# Patient Record
Sex: FEMALE | Race: WHITE | NOT HISPANIC OR LATINO | URBAN - METROPOLITAN AREA
[De-identification: names, ages, dates, MRNs, and addresses within clinical notes are randomized per-mention and may not be internally consistent; named-entity substitution may affect disease eponyms.]

---

## 2021-09-09 ENCOUNTER — HOSPITAL ENCOUNTER (OUTPATIENT)
Facility: CLINIC | Age: 26
Discharge: HOME | End: 2021-09-09
Attending: FAMILY MEDICINE

## 2021-09-09 VITALS
OXYGEN SATURATION: 99 % | TEMPERATURE: 97.9 F | HEART RATE: 67 BPM | SYSTOLIC BLOOD PRESSURE: 122 MMHG | DIASTOLIC BLOOD PRESSURE: 58 MMHG | RESPIRATION RATE: 16 BRPM

## 2021-09-09 DIAGNOSIS — Z02.5 SPORTS PHYSICAL: Primary | ICD-10-CM

## 2021-09-09 PROCEDURE — 99499SP PR $65 PHYSICAL: Performed by: FAMILY MEDICINE

## 2021-09-09 ASSESSMENT — ENCOUNTER SYMPTOMS
SHORTNESS OF BREATH: 0
HEADACHES: 0
SEIZURES: 0
DIZZINESS: 0
BACK PAIN: 0
CHILLS: 0
FEVER: 0

## 2021-09-09 NOTE — ED PROVIDER NOTES
History  No chief complaint on file.    26yoF with no pertinent PMH presents for college sports physical exam. Denies FH of sudden cardiac death. Denies personal h/o CP, palpitations, SOB, or syncope during exercise. Pt has never been told she cannot play sports due to a medical reason. Pt has no concerns today. Denies h/o Covid and is fully vaccinated.          History reviewed. No pertinent past medical history.    No past surgical history on file.    History reviewed. No pertinent family history.    Social History     Tobacco Use   • Smoking status: Never Smoker   • Smokeless tobacco: Never Used   Substance Use Topics   • Alcohol use: Not on file   • Drug use: Not on file       Review of Systems   Constitutional: Negative for chills and fever.   HENT: Negative for hearing loss.    Eyes: Negative for visual disturbance.   Respiratory: Negative for shortness of breath.    Cardiovascular: Negative for chest pain.   Musculoskeletal: Negative for back pain.   Skin: Negative for rash.   Neurological: Negative for dizziness, seizures and headaches.   All other systems reviewed and are negative.      Physical Exam  ED Triage Vitals [09/09/21 1417]   Temp Heart Rate Resp BP SpO2   36.6 °C (97.9 °F) 67 16 (!) 122/58 99 %      Temp src Heart Rate Source Patient Position BP Location FiO2 (%) (Set)   -- -- -- -- --       Physical Exam  Vitals and nursing note reviewed.   Constitutional:       General: She is not in acute distress.     Appearance: Normal appearance. She is not ill-appearing.   HENT:      Head: Normocephalic and atraumatic.      Right Ear: Tympanic membrane, ear canal and external ear normal.      Left Ear: Tympanic membrane, ear canal and external ear normal.      Nose: Nose normal.      Mouth/Throat:      Mouth: Mucous membranes are moist.      Pharynx: Oropharynx is clear.   Eyes:      Extraocular Movements: Extraocular movements intact.      Conjunctiva/sclera: Conjunctivae normal.      Pupils: Pupils are  equal, round, and reactive to light.   Cardiovascular:      Rate and Rhythm: Normal rate and regular rhythm.      Pulses: Normal pulses.      Heart sounds: Normal heart sounds. No murmur heard.        Comments: No murmurs appreciated with provocative maneuvers  Pulmonary:      Effort: Pulmonary effort is normal. No respiratory distress.      Breath sounds: Normal breath sounds.   Abdominal:      General: There is no distension.      Palpations: Abdomen is soft.      Tenderness: There is no abdominal tenderness. There is no guarding.   Musculoskeletal:         General: Normal range of motion.      Cervical back: Normal range of motion and neck supple.   Skin:     General: Skin is warm and dry.   Neurological:      General: No focal deficit present.      Mental Status: She is alert.   Psychiatric:         Mood and Affect: Mood normal.           Procedures  Procedures    UC Course  Clinical Impressions as of Sep 09 1528   Sports physical       MDM  Number of Diagnoses or Management Options  Sports physical  Diagnosis management comments: Form completed and returned to patient. F/u with PCP as needed                 Zamzam Eckert,   09/09/21 1531       Zamzam Eckert,   09/09/21 1541

## 2022-02-14 ENCOUNTER — HOSPITAL ENCOUNTER (EMERGENCY)
Facility: HOSPITAL | Age: 27
Discharge: HOME | End: 2022-02-14
Attending: EMERGENCY MEDICINE | Admitting: EMERGENCY MEDICINE
Payer: COMMERCIAL

## 2022-02-14 VITALS
HEART RATE: 69 BPM | WEIGHT: 136 LBS | OXYGEN SATURATION: 100 % | TEMPERATURE: 97.5 F | SYSTOLIC BLOOD PRESSURE: 128 MMHG | DIASTOLIC BLOOD PRESSURE: 69 MMHG | RESPIRATION RATE: 16 BRPM

## 2022-02-14 DIAGNOSIS — M62.838 TRAPEZIUS MUSCLE SPASM: Primary | ICD-10-CM

## 2022-02-14 PROCEDURE — 99281 EMR DPT VST MAYX REQ PHY/QHP: CPT

## 2022-02-14 PROCEDURE — 63700000 HC SELF-ADMINISTRABLE DRUG: Performed by: PHYSICIAN ASSISTANT

## 2022-02-14 RX ORDER — LIDOCAINE 560 MG/1
1 PATCH PERCUTANEOUS; TOPICAL; TRANSDERMAL ONCE
Status: DISCONTINUED | OUTPATIENT
Start: 2022-02-14 | End: 2022-02-15 | Stop reason: HOSPADM

## 2022-02-14 RX ORDER — DIAZEPAM 5 MG/1
5 TABLET ORAL EVERY 6 HOURS PRN
Qty: 10 TABLET | Refills: 0 | Status: SHIPPED | OUTPATIENT
Start: 2022-02-14 | End: 2022-03-16

## 2022-02-14 RX ORDER — IBUPROFEN 600 MG/1
600 TABLET ORAL ONCE
Status: DISCONTINUED | OUTPATIENT
Start: 2022-02-14 | End: 2022-02-15 | Stop reason: HOSPADM

## 2022-02-14 RX ORDER — ISOTRETINOIN 40 MG/1
40 CAPSULE ORAL 2 TIMES DAILY
COMMUNITY

## 2022-02-14 RX ORDER — DIAZEPAM 5 MG/1
5 TABLET ORAL ONCE
Status: DISCONTINUED | OUTPATIENT
Start: 2022-02-14 | End: 2022-02-14

## 2022-02-14 RX ADMIN — LIDOCAINE 1 PATCH: 246 PATCH TOPICAL at 21:46

## 2022-02-14 ASSESSMENT — ENCOUNTER SYMPTOMS
NUMBNESS: 1
CHILLS: 0
SHORTNESS OF BREATH: 0
HEADACHES: 0
COLOR CHANGE: 0
WEAKNESS: 0
NAUSEA: 0
COUGH: 0
VOMITING: 0
BACK PAIN: 1
DIZZINESS: 0
JOINT SWELLING: 0
ABDOMINAL PAIN: 0
NECK PAIN: 1
FEVER: 0

## 2022-02-15 NOTE — ED PROVIDER NOTES
Emergency Medicine Note  HPI   HISTORY OF PRESENT ILLNESS     25 y/o F with history of chronic neck and back pain secondary to competitive sports in the past presents to the ED for evaluation of right sided neck and scapular pain that began this morning.  Patient woke up without issue and then developed right sided neck pain that migrated to a stabbing pain in right scapula while sitting in class with a tingling sensation in right hand.  No injury, trauma, new exercise, or changes to sleeping position.  Patient took Tylenol and applied ice without relief.  Denies recent illness.  Patient experiences chronic neck/back pain that is typically less severe and in a different location.  She runs for 30 minutes a few times per week - no new strenuous activity. Denies any work outs in the past few days.  Recently started Accutane 3 weeks ago.              Patient History   PAST HISTORY     Reviewed from Nursing Triage:       History reviewed. No pertinent past medical history.    History reviewed. No pertinent surgical history.    History reviewed. No pertinent family history.    Social History     Tobacco Use   • Smoking status: Never Smoker   • Smokeless tobacco: Never Used   Substance Use Topics   • Alcohol use: Not on file   • Drug use: Not on file         Review of Systems   REVIEW OF SYSTEMS     Review of Systems   Constitutional: Negative for chills and fever.   Respiratory: Negative for cough and shortness of breath.    Cardiovascular: Negative for chest pain and leg swelling.   Gastrointestinal: Negative for abdominal pain, nausea and vomiting.   Musculoskeletal: Positive for back pain (R scapula) and neck pain (R neck). Negative for joint swelling.   Skin: Negative for color change and pallor.   Neurological: Positive for numbness (R hand). Negative for dizziness, weakness and headaches.         VITALS     ED Vitals    Date/Time Temp Pulse Resp BP SpO2 [facility]   02/14/22 2021 36.4 °C (97.5 °F) 69 16 128/69 100 % MMM         Pulse Ox %: 100 % (02/14/22 2246)  Pulse Ox Interpretation: Normal (02/14/22 2246)           Physical Exam   PHYSICAL EXAM     Physical Exam  Vitals and nursing note reviewed.   Constitutional:       General: She is not in acute distress.     Appearance: Normal appearance. She is normal weight.   HENT:      Head: Normocephalic and atraumatic.   Eyes:      Conjunctiva/sclera: Conjunctivae normal.   Cardiovascular:      Rate and Rhythm: Normal rate and regular rhythm.      Pulses: Normal pulses.           Radial pulses are 2+ on the right side and 2+ on the left side.   Pulmonary:      Effort: Pulmonary effort is normal.      Breath sounds: Normal breath sounds. No decreased breath sounds or rhonchi.   Musculoskeletal:         General: Normal range of motion.      Right shoulder: Normal. Normal range of motion. Normal strength. Normal pulse.      Left shoulder: Normal.      Right upper arm: Normal.      Left upper arm: Normal.      Right wrist: Normal pulse.      Left wrist: Normal pulse.      Right hand: Normal strength. Normal capillary refill. Normal pulse.      Left hand: Normal strength. Normal capillary refill. Normal pulse.      Cervical back: Normal range of motion and neck supple. Spasms (right trapezius) and tenderness (right trapezius) present. No swelling, deformity, erythema, rigidity or bony tenderness. Normal range of motion.      Thoracic back: Tenderness (subscapular) present. No deformity or bony tenderness. Normal range of motion.      Lumbar back: Normal. No swelling, tenderness or bony tenderness. Normal range of motion.   Skin:     General: Skin is warm and dry.      Capillary Refill: Capillary refill takes less than 2 seconds.   Neurological:      General: No focal deficit present.      Mental Status: She is alert and oriented to person, place, and time.      Sensory: Sensation is intact. No sensory deficit.      Motor: Motor function is intact. No weakness.   Psychiatric:         Mood  and Affect: Mood normal.         Behavior: Behavior normal. Behavior is cooperative.           PROCEDURES     Procedures     DATA     Results     None          Imaging Results    None         No orders to display       Scoring tools                                 ED Course & MDM   MDM / ED COURSE / CLINICAL IMPRESSIONS / DISPO     MDM     The patient was hesitant about taking medications due to the interaction with Accutane.  We looked the medications up and explained that there is no interactions between Accutane, Tylenol, ibuprofen, Flexeril, Valium.  She decided that she would take the ibuprofen here and take a prescription for Valium to take at home.  We recommended using ice and heat on the affected area.  She is neurovascular intact.  Return precautions were discussed    Clinical Impressions as of 02/15/22 3521   Trapezius muscle spasm     Discharge       By signing my name below, IRosita, attest that this documentation has been prepared under the direction and in the presence of Cassia Jha PA-C    2/15/2022 5:28 AM      Cassia SHAH, personally performed the services described in this documentation, as documented by the scribe in my presence, and it is both accurate and complete.  2/15/2022 5:28 AM       Rosita Laird  02/14/22 8169       Cassia Jha PA C  02/15/22 3571

## 2022-02-15 NOTE — DISCHARGE INSTRUCTIONS
TAKE MOTRIN AND TYLENOL EVERY 6 HOURS FOR PAIN  APPLY A LIDOCAINE PATCH ON THE AFFECTED AREA  TAKE VALIUM AS NEEDED FOR SPASM -THIS MEDICATION WILL MAKE YOU TIRED, DO NOT DRIVE OR DRINK ALCOHOL WHILE TAKING      ALTERNATE ICE AND HEAT ON THE AFFECTED AREA, 20 MINUTES ON 20 MINUTES OFF      RETURN TO THE ER IF ANY INCREASE IN FEVER>101, INCREASE NECK PAIN, BACK PAIN, NUMBNESS OR TINGLING IN YOUR EXTREMITIES, WEAKNESS IN YOUR EXTREMITIES, HEADACHE, DIZZINESS, VISION CHANGES, VOMITING, CHEST PAIN, SHORTNESS OF BREATH, CHANGE IN URINATION OR ANY CONCERNS

## 2022-08-24 ENCOUNTER — NEW PATIENT COMPREHENSIVE (OUTPATIENT)
Dept: URBAN - METROPOLITAN AREA CLINIC 76 | Facility: CLINIC | Age: 27
End: 2022-08-24

## 2022-08-24 DIAGNOSIS — H04.123: ICD-10-CM

## 2022-08-24 DIAGNOSIS — H02.886: ICD-10-CM

## 2022-08-24 DIAGNOSIS — H52.13: ICD-10-CM

## 2022-08-24 DIAGNOSIS — H02.883: ICD-10-CM

## 2022-08-24 PROCEDURE — 92004 COMPRE OPH EXAM NEW PT 1/>: CPT

## 2022-08-24 PROCEDURE — 92015 DETERMINE REFRACTIVE STATE: CPT

## 2022-08-24 ASSESSMENT — KERATOMETRY
OS_K2POWER_DIOPTERS: 42.75
OS_AXISANGLE_DEGREES: 24
OS_AXISANGLE2_DEGREES: 114
OS_K1POWER_DIOPTERS: 42.25
OD_AXISANGLE2_DEGREES: 74
OD_AXISANGLE_DEGREES: 164
OD_K2POWER_DIOPTERS: 42.25
OD_K1POWER_DIOPTERS: 41.75

## 2022-08-24 ASSESSMENT — VISUAL ACUITY
OD_CC: 20/20
OS_CC: 20/20-1
OU_CC: 20/20

## 2022-08-24 ASSESSMENT — TONOMETRY
OS_IOP_MMHG: 16
OD_IOP_MMHG: 16

## 2022-11-04 ENCOUNTER — HOSPITAL ENCOUNTER (OUTPATIENT)
Dept: RADIOLOGY | Facility: HOSPITAL | Age: 27
Discharge: HOME | End: 2022-11-04

## 2022-11-04 DIAGNOSIS — S93.402A SPRAIN OF LEFT ANKLE, UNSPECIFIED LIGAMENT, INITIAL ENCOUNTER: ICD-10-CM

## 2022-11-18 ENCOUNTER — EVALUATION (OUTPATIENT)
Dept: PHYSICAL THERAPY | Facility: CLINIC | Age: 27
End: 2022-11-18

## 2022-11-18 DIAGNOSIS — M25.572 ACUTE LEFT ANKLE PAIN: ICD-10-CM

## 2022-11-18 DIAGNOSIS — S93.492A SPRAIN OF ANTERIOR TALOFIBULAR LIGAMENT OF LEFT ANKLE, INITIAL ENCOUNTER: Primary | ICD-10-CM

## 2022-11-18 NOTE — PROGRESS NOTES
PT Evaluation   Today's date: 2022  Patient name: John Courtney  : 1995  MRN: [id number]  Referring provider: No ref  provider found  Dx:   Encounter Diagnosis     ICD-10-CM    1  Sprain of anterior talofibular ligament of left ankle, initial encounter  S93 492A       2  Acute left ankle pain  M25 572           Assessment    John Courtney is a pleasant 33 y o  female who presents with a referring diagnosis of sprain of Lt ATFL  The primary movement system diagnosis is hypomobility with decreased proprioception with nociceptive pain resulting in pathoanatomical symptoms of impaired range of motion, impaired neuromuscular control, and weightbearing intolerance  The aforementioned impairments have limited the patient's ability to participate in recreational activities and running  No further referral is neccessary at this time based upon examination results  Positive prognostic indicators include motivation to improve, positive attitude and age  Negative prognostic indicators include none  Impairments: abnormal or restricted ROM, activity intolerance, Impaired balance, Impaired physical strength, lacks appropriate HEP, pain with function, weight-bearing intolerance, poor posture and poor body mechanics    Symptom Irritability: low    Problem List:  - impaired range of motion   - impaired neuromuscular control     Patient education performed this session included: home exercise program, plan of care, expected tissue healing time, prognosis and diagnosis and ice    Patient verbalized excellent understanding of POC  Please contact me if you have any questions or recommendations  Thank you for the referral and the opportunity to share in [address] care         Plan    Patient would benefit from: Skilled PT  Planned modality interventions: biofeedback, cryotherapy and thermotherapy (hot pack)  Planned therapy interventions: activity modification, balance/weight bearing training, behavior modification, body mechanics training, functional ROM exercises, graded exercise, HEP, joint mobilization, manual therapy, Carroll taping, motor coordination training, neuromuscular re-education, patient education, postural training, strengthening, stretching, therapeutic activities and therapeutic exercises    Frequency: 1x per week  Duration in weeks: 6 weeks    Plan of Care beginning date: 11/18/2022  Plan of Care expiration date: 6 weeks - 12/30/2022  Treatment plan discussed with: patient      Goals    Short Term Goals (3 weeks):    1  Patient will be independent with basic HEP  2  Patient will report >50% reduction in pain  3  Patient will demonstrate >1/3 improvement in MMT grade as applicable  4  Patient will improve Lt ankle range of motion to equal to Rt     Long Term Goals (6 weeks):  1  Patient will be independent in a comprehensive home exercise program  2  Patient FOTO score will improve to 76/100  3  Patient will self-report >75% improvement in function  4  Patient will be able to run for 30 minutes with pain <3/10  5  Patient will be able to ski/snowboard with pain <3/10      History    History of Present Illness  Mechanism of injury: Patient reports a fall down stairs approx 1 month ago  Although she was unable to weightbear, she assumed she had obtained an sprain  When it continued to hurt and became swollen, she saw ortho last week  Xrays showed a nondisplaced fracture of the anterior process of calcaneus  Ortho gave her a brace to wear and she states that she has been wearing it on her long days  Overall, she was on crutches for 3 weeks and NWBing for 2 weeks  Patient tried running again prior to knowing it was fractured so she has stopped since  She does have occasional numbness in her Lt toes since injury      Prior level of function: patient is in PT school; she runs approx 1 hour per day    Weight bearing status: FWB  AD: none    Pain  Current: 2-3/10  At best: 2-3/10  At worst: 5/10    Location: lateral aspect of midfoot, lateral top of foot  Description: dull aching, shooting  Aggravating factors: sleeping (side sleeper), walking the dog (on grass)  Alleviating factors: ice, medications and rest    Progression: improved, plateaued since ortho visit    Patient goals for therapy: ambulate on uneven terrain, running      Physical Examination    Red Flag Screen  (+) none    Lumbar Spine ROM  WNL, no pain or limitations in flexion, extension, lateral flexion and rotation    LE ROM   11/18/2022    LEFT RIGHT     Ankle DF 0* 15    Ankle PF 45 50    Ankle Inversion 10* 25    Ankle Eversion 25 35     (*) = reproduction of patients reported pain      LE MMT   11/18/2022    LEFT RIGHT     Ankle DF 4/5 5/5    Ankle PF 4/5 5/5    Ankle Inversion 4/5 5/5    Ankle Eversion 4/5 5/5       Great Toe Extension 5/5 5/5     (*) = reproduction of patients reported pain    Sensation  Light touch: intact bilaterally    Palpation  (+) ATFL and base of 5th metatarsal, anterior talus    Joint Mobility  Talocrural: hypomobile  Subtalar: hypomobile  Midfoot : hypomobile  Forefoot: hypomobile    Edema   11/18/2022    LEFT RIGHT     Figure 8 50 cm 50 cm    5th met head 23 5 cm 22 5 cm              Insurance:  AMA/CMS Eval/ Re-eval POC expires Caron Hernandeziphant #/ Referral # Total units  Start date  Expiration date Extension  Visit limitation? PT only or  PT+OT? Co-Insurance   [facility]) 11/18/2022 6 weeks - 12/30/2022  auth needed                                                                         Date               Units:  Used               Authed:  Remaining                     Date               Units:  Used               Authed:  Remaining                  Precautions: No past medical history on file  Patient provided verbal consent to treatment plan and recommended interventions      Date 11/18/2022        Visit Number IE        FOTO Tracking Intake Survey     Follow-up #1   Manual         Ankle mobs Forefoot mobs                                    TherEx         Active JORDAN         Ankle PROM DF stretch 10x10 sec    Inv/ev towel stretch 10x10 sec        Ankle AROM DF MWM with strap x10        Ankle TB 4-way GTB 2x10 each                                                     Neuro Re-Ed         Ankle alphabet         Fitter         BAPS         Foot intrinsics                                             TherAct         Patient education HEP and POC x 10 min                                            Gait Training         AD training                           Modalities         Ice

## 2022-11-25 ENCOUNTER — APPOINTMENT (OUTPATIENT)
Dept: PHYSICAL THERAPY | Facility: CLINIC | Age: 27
End: 2022-11-25

## 2022-12-08 ENCOUNTER — OFFICE VISIT (OUTPATIENT)
Dept: PHYSICAL THERAPY | Facility: CLINIC | Age: 27
End: 2022-12-08

## 2022-12-08 DIAGNOSIS — S93.492A SPRAIN OF ANTERIOR TALOFIBULAR LIGAMENT OF LEFT ANKLE, INITIAL ENCOUNTER: Primary | ICD-10-CM

## 2022-12-08 DIAGNOSIS — M25.572 ACUTE LEFT ANKLE PAIN: ICD-10-CM

## 2022-12-08 NOTE — PROGRESS NOTES
Daily Note     Today's date: 2022  Patient name: Turner Fallon  : 1995  MRN: [id number]  Referring provider: Tamiko Lizama MD  Dx:   Encounter Diagnosis     ICD-10-CM    1  Sprain of anterior talofibular ligament of left ankle, initial encounter  S93 492A       2  Acute left ankle pain  M25 572                  Subjective: Patient reports that she feels her ankle is weak and has been rolling on her with walking  Reports good compliance with HEP  Objective: See treatment diary below    Assessment: Tolerated treatment fair  Patient has moderate loss of active and passive inversion  Slight discomfort reported with anterior step up exercise near base of 5th MT  Patient reports that she has rolled ankle on 2 occasions and that she may be seeking another X-ray to make sure bone is healing  Unable to perform SLS for due to reporting of foot pain  Plan: Continue per plan of care  Insurance:  AMA/CMS Eval/ Re-eval POC expires Guan Freddy #/ Referral # Total units  Start date  Expiration date Extension  Visit limitation? PT only or  PT+OT? Co-Insurance   [facility]) 2022 6 weeks - 2023  [id number] 12V                          Date  12 8             Units:  Used 1 2             Authed: 12 visits Remaining  11 10               Precautions: No past medical history on file  Patient provided verbal consent to treatment plan and recommended interventions  Date 2022       Visit Number IE 2       FOTO Tracking Intake Survey     Follow-up #1   Manual         Ankle mobs  TCJ, subtalar gr  3       Forefoot mobs         Inversion ROM  FB                TherEx         Active JORDAN  Bike 5', lvl 4       Ankle PROM DF stretch 10x10 sec    Inv/ev towel stretch 10x10 sec At home       Ankle AROM DF MWM with strap x10 MWM 2*15 manually       Ankle TB 4-way GTB 2x10 each At home       Anterior step up  2*15, onto 6" + airex       Calf raise  3*12       Eversion/inv   With towel 2*10, 2 in, 2 out                         Neuro Re-Ed         Ankle alphabet         Fitter  2*20 inv   Elena Bianchi         Patient education HEP and POC x 10 min                                            Gait Training                           Modalities         Ice

## 2022-12-09 ENCOUNTER — APPOINTMENT (OUTPATIENT)
Dept: PHYSICAL THERAPY | Facility: CLINIC | Age: 27
End: 2022-12-09

## 2022-12-23 ENCOUNTER — OFFICE VISIT (OUTPATIENT)
Dept: PHYSICAL THERAPY | Facility: CLINIC | Age: 27
End: 2022-12-23

## 2022-12-23 DIAGNOSIS — S93.492A SPRAIN OF ANTERIOR TALOFIBULAR LIGAMENT OF LEFT ANKLE, INITIAL ENCOUNTER: Primary | ICD-10-CM

## 2022-12-23 DIAGNOSIS — M25.572 ACUTE LEFT ANKLE PAIN: ICD-10-CM

## 2022-12-23 NOTE — LETTER
2022    Karin Cuevas MD  [facility]    Patient: Modesto Montalvo   YOB: 1995   Date of Visit: 2022     Encounter Diagnosis     ICD-10-CM    1  Sprain of anterior talofibular ligament of left ankle, initial encounter  S93 492A       2  Acute left ankle pain  M25 572           Dear Dr Claude Course: Thank you for your recent referral of Modesto Montalvo  Please review the attached evaluation summary from Mohinder's recent visit  Please verify that you agree with the plan of care by signing the attached order  If you have any questions or concerns, please do not hesitate to call  I sincerely appreciate the opportunity to share in the care of one of your patients and hope to have another opportunity to work with you in the near future  Sincerely,    Levy Lawson      Referring Provider:      I certify that I have read the below Plan of Care and certify the need for these services furnished under this plan of treatment while under my care  Karin Cuevas MD  [facility]  Via Fax: [phone]          Daily Note     Today's date: 2022  Patient name: Modesto Montalvo  : 1995  MRN: [id number]  Referring provider: Stacy Dunlap MD  Dx:   Encounter Diagnosis     ICD-10-CM    1  Sprain of anterior talofibular ligament of left ankle, initial encounter  S93 492A       2  Acute left ankle pain  M25 572         Start Time: 1230  Stop Time: 1315  Total time in clinic (min): 45 minutes  Subjective: Patient reports that she has had any major changes since last session, but she has had improvement in pain  She did get another xray which showed no change in the fracture  She has a follow-up appt next week  Objective: See treatment diary below    Assessment: Tolerated treatment fair   Focused today's session on improving range of motion of ankle, specifically into inversion and version  Educated patient on importance of mobility as mobility has worsened since IE  Patient expressed understanding  Added foot intrinsic activities to today's session as well as to HEP to encourage arch support during functional activities  This was further reinforced with navicular sling taping  Patient would benefit from continued skilled PT to address functional mobility and strength in order to return to PLOF  Plan: Continue per plan of care  POC extension 6 weeks - 3/6/22  Insurance:  AMA/CMS Eval/ Re-eval POC expires Noemy Walker #/ Referral # Total units  Start date  Expiration date Extension  Visit limitation? PT only or  PT+OT? Co-Insurance   [facility]) 11/21/2022 6 weeks - 1/2/2023  [id number] 12V                          Date 11 18 12 8 12/23            Units:  Used 1 2 3            Authed: 12 visits Remaining  11 10 9              Precautions: No past medical history on file  Patient provided verbal consent to treatment plan and recommended interventions  Date 11/21/2022 12/8/22 12/23/22      Visit Number IE 2 3      FOTO Tracking Intake Survey     Follow-up #1   Manual         Ankle mobs  TCJ, subtalar gr  3 IASTM to ant tib tendon x 8 min      Forefoot mobs         Inversion ROM  FB       Taping    Navicular sling tape x 7 min               TherEx         Active JORDAN  Bike 5', lvl 4 RB x 5 min      Ankle PROM DF stretch 10x10 sec    Inv/ev towel stretch 10x10 sec At home X 8 min in all directions      Ankle AROM DF MWM with strap x10 MWM 2*15 manually       Ankle TB 4-way GTB 2x10 each At home rev      Anterior step up  2*15, onto 6" + airex       Calf raise  3*12 3x12      Eversion/inv  With towel  2*10, 2 in, 2 out                         Neuro Re-Ed         Ankle alphabet         Fitter  2*20 inv   Ev  Poarch board       BAPS         Foot intrinsics   Doming on foam x20 seated; x20 standing    Towel scrunches x20    Toe yoga x20 each TherAct         Patient education HEP and POC x 10 min  HEP and POC x 10                                          Gait Training                           Modalities         Ice

## 2022-12-23 NOTE — PROGRESS NOTES
Daily Note     Today's date: 2022  Patient name: Ceci Weber  : 1995  MRN: [id number]  Referring provider: Lia Logan MD  Dx:   Encounter Diagnosis     ICD-10-CM    1  Sprain of anterior talofibular ligament of left ankle, initial encounter  S93 492A       2  Acute left ankle pain  M25 572         Start Time: 1230  Stop Time: 1315  Total time in clinic (min): 45 minutes  Subjective: Patient reports that she has had any major changes since last session, but she has had improvement in pain  She did get another xray which showed no change in the fracture  She has a follow-up appt next week  Objective: See treatment diary below    Assessment: Tolerated treatment fair  Focused today's session on improving range of motion of ankle, specifically into inversion and version  Educated patient on importance of mobility as mobility has worsened since IE  Patient expressed understanding  Added foot intrinsic activities to today's session as well as to HEP to encourage arch support during functional activities  This was further reinforced with navicular sling taping  Patient would benefit from continued skilled PT to address functional mobility and strength in order to return to PLOF  Plan: Continue per plan of care  POC extension 6 weeks - 3/6/22  Insurance:  AMA/CMS Eval/ Re-eval POC expires [facility] Govern #/ Referral # Total units  Start date  Expiration date Extension  Visit limitation? PT only or  PT+OT? Co-Insurance   [facility]) 2022 6 weeks - 2023  [id number] 12V                          Date             Units:  Used 1 2 3            Authed: 12 visits Remaining  11 10 9              Precautions: No past medical history on file  Patient provided verbal consent to treatment plan and recommended interventions      Date 2022      Visit Number IE 2 3      FOTO Tracking Intake Survey     Follow-up #1   Manual         Ankle mobs  TCJ, subtalar gr  3 IASTM to ant tib tendon x 8 min      Forefoot mobs         Inversion ROM  FB       Taping    Navicular sling tape x 7 min               TherEx         Active JORDAN  Bike 5', lvl 4 RB x 5 min      Ankle PROM DF stretch 10x10 sec    Inv/ev towel stretch 10x10 sec At home X 8 min in all directions      Ankle AROM DF MWM with strap x10 MWM 2*15 manually       Ankle TB 4-way GTB 2x10 each At home rev      Anterior step up  2*15, onto 6" + airex       Calf raise  3*12 3x12      Eversion/inv  With towel  2*10, 2 in, 2 out                         Neuro Re-Ed         Ankle alphabet         Fitter  2*20 inv   Ev  [place] board       BAPS         Foot intrinsics   Doming on foam x20 seated; x20 standing    Towel scrunches x20    Toe yoga x20 each                                          TherAct         Patient education HEP and POC x 10 min  HEP and POC x 10                                          Gait Training                           Modalities         Ice

## 2022-12-30 ENCOUNTER — OFFICE VISIT (OUTPATIENT)
Dept: PHYSICAL THERAPY | Facility: CLINIC | Age: 27
End: 2022-12-30

## 2022-12-30 DIAGNOSIS — M25.572 ACUTE LEFT ANKLE PAIN: ICD-10-CM

## 2022-12-30 DIAGNOSIS — S93.492A SPRAIN OF ANTERIOR TALOFIBULAR LIGAMENT OF LEFT ANKLE, INITIAL ENCOUNTER: Primary | ICD-10-CM

## 2022-12-30 NOTE — PROGRESS NOTES
Daily Note     Today's date: 2022  Patient name: Hansel Mccollum  : 1995  MRN: [id number]  Referring provider: Jeff Lopez MD  Dx:   Encounter Diagnosis     ICD-10-CM    1  Sprain of anterior talofibular ligament of left ankle, initial encounter  S93 492A       2  Acute left ankle pain  M25 572         Start Time: 1230  Stop Time: 1315  Total time in clinic (min): 45 minutes  Subjective: Patient reports she is feeling slightly better and has more motion  She states that she went snowboarding this past week and it didn't bother her [place] the [place], but was slightly sore afterwards  She did get another xray which showed no change in the fracture  She has another follow-up appt next week  Objective: See treatment diary below    Assessment: Tolerated treatment fair  Continued advancement of mobility activities with introduction of eccentric and SL calf raises to further advance functional gastroc strength  Patient demonstrates improvement in ankle range of motion in comparison to last session, but continues to be limited in inversion, eversion, and mild plantarflexion  Continued with taping as patient reported increased support and decreased instability after taping last session  Patient would benefit from continued skilled PT to address functional mobility and strength in order to return to PLOF  Plan: Continue per plan of care  POC extension 6 weeks - 3/6/22  Insurance:  AMA/CMS Eval/ Re-eval POC expires Lori Lean #/ Referral # Total units  Start date  Expiration date Extension  Visit limitation? PT only or  PT+OT? Co-Insurance   [facility]) 2022 6 weeks - 2023  [id number] 12V                          Date  12 8            Units:  Used 1 2 3 4           Authed: 12 visits Remaining  11 10 9 8             Precautions: No past medical history on file  Patient provided verbal consent to treatment plan and recommended interventions      Date 11/21/2022 12/8/22 12/23/22 12/30/22     Visit Number IE 2 3 4     FOTO Tracking Intake Survey     Follow-up #1   Manual         Ankle mobs  TCJ, subtalar gr  3 IASTM to ant tib tendon x 8 min IASTM to ant tib tendon x 8 min     Forefoot mobs         Inversion ROM  FB       Taping    Navicular sling tape x 7 min Navicular sling tape x 7 min              TherEx         Active JORDAN  Bike 5', lvl 4 RB x 5 min RB x 5 min     Ankle PROM DF stretch 10x10 sec    Inv/ev towel stretch 10x10 sec At home X 8 min in all directions      Ankle AROM DF MWM with strap x10 MWM 2*15 manually       Ankle TB 4-way GTB 2x10 each At home rev      Anterior step up  2*15, onto 6" + airex       Calf raise  3*12 3x12 Up B/L, down Lt 2x10    SL on total gym 2x10     Eversion/inv  With towel  2*10, 2 in, 2 out                         Neuro Re-Ed         Ankle alphabet         Fitter  2*20 inv   Ev  [place] board  [place] x20 each: FW/BW, M/L, CW, CCW     BAPS         Foot intrinsics   Doming on foam x20 seated; x20 standing    Towel scrunches x20    Toe yoga x20 each Doming on foam x20 seated; x20 standing    Towel scrunches x20    Toe yoga x20 each                                         TherAct         Patient education HEP and POC x 10 min  HEP and POC x 10                                          Gait Training                           Modalities         Ice

## 2023-01-27 ENCOUNTER — APPOINTMENT (EMERGENCY)
Dept: RADIOLOGY | Facility: HOSPITAL | Age: 28
End: 2023-01-27

## 2023-01-27 ENCOUNTER — HOSPITAL ENCOUNTER (EMERGENCY)
Facility: HOSPITAL | Age: 28
Discharge: HOME | End: 2023-01-27
Attending: EMERGENCY MEDICINE

## 2023-01-27 VITALS
TEMPERATURE: 98.1 F | SYSTOLIC BLOOD PRESSURE: 129 MMHG | OXYGEN SATURATION: 100 % | DIASTOLIC BLOOD PRESSURE: 68 MMHG | RESPIRATION RATE: 22 BRPM | HEART RATE: 78 BPM | WEIGHT: 147.05 LBS

## 2023-01-27 DIAGNOSIS — S93.602A SPRAIN OF LEFT FOOT, INITIAL ENCOUNTER: Primary | ICD-10-CM

## 2023-02-11 ENCOUNTER — HOSPITAL ENCOUNTER (OUTPATIENT)
Dept: RADIOLOGY | Facility: HOSPITAL | Age: 28
Discharge: HOME | End: 2023-02-11

## 2023-02-11 DIAGNOSIS — S93.492A SPRAIN OF ANTERIOR TALOFIBULAR LIGAMENT OF LEFT ANKLE, INITIAL ENCOUNTER: ICD-10-CM

## 2023-08-12 ENCOUNTER — APPOINTMENT (OUTPATIENT)
Dept: RADIOLOGY | Facility: HOSPITAL | Age: 28
End: 2023-08-12
Payer: COMMERCIAL

## 2023-08-12 ENCOUNTER — HOSPITAL ENCOUNTER (EMERGENCY)
Facility: HOSPITAL | Age: 28
Discharge: HOME | End: 2023-08-12
Payer: COMMERCIAL

## 2023-08-12 VITALS
WEIGHT: 143 LBS | RESPIRATION RATE: 14 BRPM | TEMPERATURE: 97.9 F | OXYGEN SATURATION: 99 % | SYSTOLIC BLOOD PRESSURE: 109 MMHG | HEART RATE: 65 BPM | DIASTOLIC BLOOD PRESSURE: 54 MMHG

## 2023-08-12 DIAGNOSIS — M65.352 TRIGGER LITTLE FINGER OF LEFT HAND: Primary | ICD-10-CM

## 2023-08-12 PROCEDURE — 99284 EMERGENCY DEPT VISIT MOD MDM: CPT | Performed by: PHYSICIAN ASSISTANT

## 2023-08-12 PROCEDURE — 73090 X-RAY EXAM OF FOREARM: CPT

## 2023-08-12 PROCEDURE — 29126 APPL SHORT ARM SPLINT DYN: CPT | Performed by: PHYSICIAN ASSISTANT

## 2023-08-12 PROCEDURE — NC001 PR NO CHARGE: Performed by: PHYSICIAN ASSISTANT

## 2023-08-12 PROCEDURE — 99283 EMERGENCY DEPT VISIT LOW MDM: CPT

## 2023-08-12 RX ORDER — IBUPROFEN 600 MG/1
600 TABLET ORAL EVERY 6 HOURS PRN
Qty: 30 TABLET | Refills: 0 | Status: SHIPPED | OUTPATIENT
Start: 2023-08-12

## 2023-08-12 NOTE — ED PROVIDER NOTES
and palpitations. Gastrointestinal: Negative for abdominal pain and vomiting. Endocrine: Negative. Genitourinary: Negative for dysuria and hematuria. Musculoskeletal: Positive for myalgias and stiffness. Negative for arthralgias and back pain. Skin: Negative for color change and rash. Allergic/Immunologic: Negative. Neurological: Negative for seizures and syncope. Hematological: Negative. Psychiatric/Behavioral: Negative. All other systems reviewed and are negative. Physical Exam  Physical Exam  Vitals and nursing note reviewed. Constitutional:       General: She is not in acute distress. Appearance: Normal appearance. She is well-developed and normal weight. She is not ill-appearing or toxic-appearing. HENT:      Head: Normocephalic and atraumatic. Nose: Nose normal.      Mouth/Throat:      Mouth: Mucous membranes are moist.   Eyes:      General: No scleral icterus. Right eye: No discharge. Left eye: No discharge. Conjunctiva/sclera: Conjunctivae normal.   Cardiovascular:      Rate and Rhythm: Normal rate and regular rhythm. Pulses: Normal pulses. Heart sounds: Normal heart sounds. No murmur heard. Pulmonary:      Effort: Pulmonary effort is normal. No respiratory distress. Musculoskeletal:         General: Signs of injury present. No swelling. Right forearm: Normal.      Left forearm: Tenderness present. Hands:       Cervical back: Normal range of motion and neck supple. Skin:     General: Skin is warm and dry. Capillary Refill: Capillary refill takes less than 2 seconds. Neurological:      Mental Status: She is alert.    Psychiatric:         Mood and Affect: Mood normal.         Vital Signs  ED Triage Vitals [08/12/23 1459]   Temperature Pulse Respirations Blood Pressure SpO2   97.9 °F (36.6 °C) 65 14 109/54 99 %      Temp src Heart Rate Source Patient Position - Orthostatic VS BP Location FiO2 (%)   -- -- -- -- -- Pain Score       6           Vitals:    08/12/23 1459   BP: 109/54   Pulse: 65         Visual Acuity      ED Medications  Medications - No data to display    Diagnostic Studies  Results Reviewed     None                 XR forearm 2 views LEFT   Final Result by Lyle Lemon MD (08/12 1603)      No acute osseous abnormality. Workstation performed: [id number]                    Procedures  Splint application    Date/Time: 8/12/2023 5:53 PM    Performed by: Twyla Manning PA-C  Authorized by: Twyla Manning PA-C  Universal Protocol:  Consent: Verbal consent obtained. Consent given by: patient  Time out: Immediately prior to procedure a "time out" was called to verify the correct patient, procedure, equipment, support staff and site/side marked as required. Patient identity confirmed: verbally with patient      Pre-procedure details:     Sensation:  Normal    Skin color:  Pink  Procedure details:     Laterality:  Left    Location:  Wrist    Splint type:  Short arm splint, dynamic  Post-procedure details:     Pain:  Improved    Sensation:  Unchanged    Skin color:  Pink    Patient tolerance of procedure: Tolerated well, no immediate complications             ED Course                               SBIRT 22yo+    Flowsheet Row Most Recent Value   Initial Alcohol Screen: US AUDIT-C     1. How often do you have a drink containing alcohol? 0 Filed at: 08/12/2023 1501   2. How many drinks containing alcohol do you have on a typical day you are drinking? 0 Filed at: 08/12/2023 1501   3a. Male UNDER 65: How often do you have five or more drinks on one occasion? 0 Filed at: 08/12/2023 1501   3b. FEMALE Any Age, or MALE 65+: How often do you have 4 or more drinks on one occassion? 0 Filed at: 08/12/2023 1501   Audit-C Score 0 Filed at: 08/12/2023 1501   LIN: How many times in the past year have you. .. Used an illegal drug or used a prescription medication for non-medical reasons? Never Filed at: 08/12/2023 1501                    Medical Decision Making  Trigger little finger of left hand: acute illness or injury     Details: Patient with injury of forearm and resultant trigger finger  Discussed with hand specialist on-call Brenda Hayes -her team will reach out to follow-up with patient in office  Patient educated on red flag symptoms that would necessitate return to the ED  Amount and/or Complexity of Data Reviewed  Radiology: ordered and independent interpretation performed. Decision-making details documented in ED Course. Risk  Prescription drug management. Disposition  Final diagnoses:   Trigger little finger of left hand     Time reflects when diagnosis was documented in both MDM as applicable and the Disposition within this note     Time User Action Codes Description Comment    8/12/2023  4:11 PM Lavinia Parnell Add [A49.465] Trigger little finger of left hand       ED Disposition     ED Disposition   Discharge    Condition   Stable    Date/Time   Sat Aug 12, 2023  4:11 PM    Comment   Margot Suarez discharge to home/self care.                Follow-up Information     Follow up With Specialties Details Why Contact Info Additional Information    aSrika Velásquez MD Orthopedic Surgery Schedule an appointment as soon as possible for a visit   [address], [address] [facility]  [phone]       [address] Emergency Department Emergency Medicine Go to  If symptoms worsen [address] [address]  [address] [facility] Emergency Department, [address], [facility], [address]          Discharge Medication List as of 8/12/2023  4:17 PM      START taking these medications    Details   ibuprofen (MOTRIN) 600 mg tablet Take 1 tablet (600 mg total) by mouth every 6 (six) hours as needed for moderate pain, Starting Sat 8/12/2023, Normal                 PDMP Review None          ED Provider  Electronically Signed by           Richmond Rider PA-C  08/12/23 5020

## 2023-08-14 ENCOUNTER — TELEPHONE (OUTPATIENT)
Age: 28
End: 2023-08-14

## 2023-08-14 NOTE — TELEPHONE ENCOUNTER
Patient is being referred to a orthopedics. Please schedule accordingly.     [address] [facility]   [phone]